# Patient Record
Sex: MALE | Race: WHITE | URBAN - METROPOLITAN AREA
[De-identification: names, ages, dates, MRNs, and addresses within clinical notes are randomized per-mention and may not be internally consistent; named-entity substitution may affect disease eponyms.]

---

## 2018-05-17 ENCOUNTER — EMERGENCY (EMERGENCY)
Facility: HOSPITAL | Age: 33
LOS: 0 days | Discharge: HOME | End: 2018-05-17
Admitting: PHYSICIAN ASSISTANT

## 2018-05-17 VITALS
DIASTOLIC BLOOD PRESSURE: 61 MMHG | TEMPERATURE: 101 F | OXYGEN SATURATION: 99 % | RESPIRATION RATE: 20 BRPM | HEART RATE: 97 BPM | SYSTOLIC BLOOD PRESSURE: 125 MMHG

## 2018-05-17 DIAGNOSIS — J18.9 PNEUMONIA, UNSPECIFIED ORGANISM: ICD-10-CM

## 2018-05-17 DIAGNOSIS — R05 COUGH: ICD-10-CM

## 2018-05-17 RX ORDER — AZITHROMYCIN 500 MG/1
1 TABLET, FILM COATED ORAL
Qty: 6 | Refills: 0 | OUTPATIENT
Start: 2018-05-17 | End: 2018-05-21

## 2018-05-17 RX ORDER — IBUPROFEN 200 MG
600 TABLET ORAL ONCE
Qty: 0 | Refills: 0 | Status: COMPLETED | OUTPATIENT
Start: 2018-05-17 | End: 2018-05-17

## 2018-05-17 RX ADMIN — Medication 600 MILLIGRAM(S): at 15:52

## 2018-05-17 NOTE — ED PROVIDER NOTE - NS ED ROS FT
Constitutional: no chills, no recent weight loss, change in appetite or malaise  Eyes: no redness/discharge/pain/vision changes  ENT: no rhinorrhea/ear pain/sore throat  Cardiac: No chest pain, SOB or edema.  Respiratory: No respiratory distress  GI: No nausea, vomiting, diarrhea or abdominal pain.  : No dysuria, frequency, urgency or hematuria  MS: no pain to back or extremities, no loss of ROM, no weakness  Neuro: No headache or weakness. No LOC.  Skin: No skin rash.  Endocrine: No history of thyroid disease or diabetes.  Except as documented in the HPI, all other systems are negative.

## 2018-05-17 NOTE — ED PROVIDER NOTE - PROGRESS NOTE DETAILS
Risk of worsening pneumonia d/w pt.   Pt looks well.  Abx choice considered given pt's history. Speaking in full sentences.  Vitals noted, sepsis not supported.  Does not currently appear to need admission. Abx risk dw pt. Pt aware needs f/u with PCP. Pt aware needs ER if worse.  pt has good aeration. full sentences. looks well.   limits of zpak dw pt who agrees return if worse.    pt agrees to ER if any worse.  options of abx, waiting, inhaler, steroids, OTC rx all considered and dw pt.  dw pt caution advised with medication's that make you drowsy

## 2018-05-17 NOTE — ED PROVIDER NOTE - OBJECTIVE STATEMENT
PRBCs infusing without difficulty. VSS. Will monitor closely. pt with cough and fever for 10 days  100.1 in er, no meds prior  producing dark yellow sputum  no sick contacts, no recent travel

## 2018-05-17 NOTE — ED ADULT NURSE NOTE - NS ED NURSE DC INFO COMPLEXITY
Moderate: Comprehensive teaching/Verbalized Understanding/Patient asked questions/Simple: Patient demonstrates quick and easy understanding

## 2019-01-15 ENCOUNTER — APPOINTMENT (OUTPATIENT)
Dept: HEMATOLOGY ONCOLOGY | Facility: CLINIC | Age: 34
End: 2019-01-15

## 2019-01-15 ENCOUNTER — LABORATORY RESULT (OUTPATIENT)
Age: 34
End: 2019-01-15

## 2019-01-15 VITALS
WEIGHT: 150.99 LBS | TEMPERATURE: 99 F | HEART RATE: 65 BPM | RESPIRATION RATE: 18 BRPM | DIASTOLIC BLOOD PRESSURE: 60 MMHG | SYSTOLIC BLOOD PRESSURE: 129 MMHG

## 2019-01-15 DIAGNOSIS — Z80.0 FAMILY HISTORY OF MALIGNANT NEOPLASM OF DIGESTIVE ORGANS: ICD-10-CM

## 2019-01-16 ENCOUNTER — OUTPATIENT (OUTPATIENT)
Dept: OUTPATIENT SERVICES | Facility: HOSPITAL | Age: 34
LOS: 1 days | Discharge: HOME | End: 2019-01-16

## 2019-01-16 ENCOUNTER — APPOINTMENT (OUTPATIENT)
Dept: HEMATOLOGY ONCOLOGY | Facility: CLINIC | Age: 34
End: 2019-01-16

## 2019-01-17 LAB
ALBUMIN MFR SERPL ELPH: 59.2 %
ALBUMIN SERPL-MCNC: 4.4 G/DL
ALBUMIN/GLOB SERPL: 1.5 RATIO
ALPHA1 GLOB MFR SERPL ELPH: 3.8 %
ALPHA1 GLOB SERPL ELPH-MCNC: 0.3 G/DL
ALPHA2 GLOB MFR SERPL ELPH: 9.2 %
ALPHA2 GLOB SERPL ELPH-MCNC: 0.7 G/DL
B-GLOBULIN MFR SERPL ELPH: 8.4 %
B-GLOBULIN SERPL ELPH-MCNC: 0.6 G/DL
DEPRECATED KAPPA LC FREE/LAMBDA SER: 1.62 RATIO
FERRITIN SERPL-MCNC: 87 NG/ML
FOLATE SERPL-MCNC: >20 NG/ML
GAMMA GLOB FLD ELPH-MCNC: 1.4 G/DL
GAMMA GLOB MFR SERPL ELPH: 19.4 %
HAPTOGLOB SERPL-MCNC: 114 MG/DL
HCT VFR BLD CALC: 39.4 %
HGB BLD-MCNC: 13 G/DL
INTERPRETATION SERPL IEP-IMP: NORMAL
IRON SATN MFR SERPL: 18 %
IRON SERPL-MCNC: 43 UG/DL
KAPPA LC CSF-MCNC: 2.08 MG/DL
KAPPA LC SERPL-MCNC: 3.38 MG/DL
LDH SERPL-CCNC: 189 U/L
M PROTEIN SPEC IFE-MCNC: NORMAL
MCHC RBC-ENTMCNC: 28.1 PG
MCHC RBC-ENTMCNC: 33 G/DL
MCV RBC AUTO: 85.3 FL
PLATELET # BLD AUTO: 221 K/UL
PMV BLD: 8.6 FL
PROT SERPL-MCNC: 7.4 G/DL
PROT SERPL-MCNC: 7.4 G/DL
RBC # BLD: 4.62 M/UL
RBC # FLD: 12.5 %
RETICS # AUTO: 0.8 %
RETICS AGGREG/RBC NFR: 35.1 K/UL
TIBC SERPL-MCNC: 245 UG/DL
UIBC SERPL-MCNC: 202 UG/DL
VIT B12 SERPL-MCNC: 915 PG/ML
WBC # FLD AUTO: 7.58 K/UL

## 2019-01-20 NOTE — HISTORY OF PRESENT ILLNESS
[de-identified] : CC I have anemia\par \par Here at the request of Dr. Saldana \par \par A 33 yom, no significant PMH, presents after being referred by PMD for weight loss  and anemia.  Patient states that approximately 2 years, he gradually began losing weight over several months, going from 72 kg to 65 kg without a change in his diet, although he did increase his working hours.  He is an  and is on his feet all day.  (Today, patient weighs 68.49 kg.)  No fevers, LAD, or anorexia.  His PMD performed labs and found him to have a hemoglobin of 12.3 with an MCV of 84.4.  WBC normal at 9.5, and platelets normal at 233.  Iron studies showed low iron at 11, low TIBC at 220, low transferrin saturation of 5, and normal ferritin of 171, suggestive of  anemia of chronic disease.\par \par He feels well otherwise. He did gained a few kg since he works less. Does not know if anyone has anemia in family. Denies any bleeding.

## 2019-01-20 NOTE — ASSESSMENT
[FreeTextEntry1] : 33 yom sent from PMD secondary to weight loss and unexplained normocytic anemia, with iron studies suggest  anemia of chronic disease but  based on history and exam there is no suggestion of a chronic disease \par \par 1. Weight loss-may be secondary to increased physical activity.  Patient has gained weight from 65 kg to 68 kg. \par 2. Normocytic anemia-iron studies consistent with anemia of chronic disease.  However, patient has no known chronic disease. Maybe this is his baseline?  Will check repeat CBC and iron studies, alpha globin mutation, hemoglobin electrophoresis, LDH, reticulocyte count, haptoglobin, folate, B12, SPEP, free light chains, immunofixation.  Will call patient with results and base plan for further follow up depending on test results.\par If studies are negative will see me in 3-6 months for CBC. A bone marrow at this point would be unlikely of any benefit but if anemia worsens will consider \par \par \par If unable to reach patient, he requests that we call his wife Cat at [phone].

## 2019-01-20 NOTE — REVIEW OF SYSTEMS
[Negative] : Allergic/Immunologic [FreeTextEntry5] : Complains that leg veins appear distended.  Wears compression stockings. [FreeTextEntry9] : c

## 2019-01-21 LAB
HGB A MFR BLD: 97.5 %
HGB A2 MFR BLD: 2.5 %
HGB FRACT BLD-IMP: NORMAL

## 2019-01-23 LAB — METHYLMALONATE SERPL-SCNC: 261 NMOL/L

## 2019-01-28 LAB
ALPHA - GLOBIN COMMON MUTATION RESULT: NORMAL
ALPHA - GLOBIN MUTATION VERBATIM: NORMAL

## 2019-02-01 DIAGNOSIS — D64.9 ANEMIA, UNSPECIFIED: ICD-10-CM

## 2019-06-25 ENCOUNTER — APPOINTMENT (OUTPATIENT)
Dept: HEMATOLOGY ONCOLOGY | Facility: CLINIC | Age: 34
End: 2019-06-25
Payer: COMMERCIAL

## 2019-06-25 ENCOUNTER — LABORATORY RESULT (OUTPATIENT)
Age: 34
End: 2019-06-25

## 2019-06-25 ENCOUNTER — OUTPATIENT (OUTPATIENT)
Dept: OUTPATIENT SERVICES | Facility: HOSPITAL | Age: 34
LOS: 1 days | Discharge: HOME | End: 2019-06-25

## 2019-06-25 VITALS
RESPIRATION RATE: 16 BRPM | DIASTOLIC BLOOD PRESSURE: 62 MMHG | HEIGHT: 71 IN | BODY MASS INDEX: 21 KG/M2 | TEMPERATURE: 96.7 F | SYSTOLIC BLOOD PRESSURE: 122 MMHG | HEART RATE: 66 BPM | WEIGHT: 150 LBS

## 2019-06-25 DIAGNOSIS — Z00.00 ENCOUNTER FOR GENERAL ADULT MEDICAL EXAMINATION W/OUT ABNORMAL FINDINGS: ICD-10-CM

## 2019-06-25 DIAGNOSIS — D64.9 ANEMIA, UNSPECIFIED: ICD-10-CM

## 2019-06-25 PROCEDURE — 99213 OFFICE O/P EST LOW 20 MIN: CPT

## 2019-06-26 LAB
HCT VFR BLD CALC: 38.9 %
HGB BLD-MCNC: 13.1 G/DL
MCHC RBC-ENTMCNC: 28.2 PG
MCHC RBC-ENTMCNC: 33.7 G/DL
MCV RBC AUTO: 83.7 FL
PLATELET # BLD AUTO: 241 K/UL
PMV BLD: 8.6 FL
RBC # BLD: 4.65 M/UL
RBC # FLD: 12.8 %
WBC # FLD AUTO: 5.6 K/UL

## 2019-06-27 DIAGNOSIS — D64.9 ANEMIA, UNSPECIFIED: ICD-10-CM

## 2019-06-27 NOTE — HISTORY OF PRESENT ILLNESS
[de-identified] : CC I have anemia\par \par Here at the request of Dr. Saldana \par \par A 33 yom, no significant PMH, presents after being referred by PMD for weight loss  and anemia.  Patient states that approximately 2 years, he gradually began losing weight over several months, going from 72 kg to 65 kg without a change in his diet, although he did increase his working hours.  He is an  and is on his feet all day.  (Today, patient weighs 68.49 kg.)  No fevers, LAD, or anorexia.  His PMD performed labs and found him to have a hemoglobin of 12.3 with an MCV of 84.4.  WBC normal at 9.5, and platelets normal at 233.  Iron studies showed low iron at 11, low TIBC at 220, low transferrin saturation of 5, and normal ferritin of 171, suggestive of  anemia of chronic disease.\par \par He feels well otherwise. He did gained a few kg since he works less. Does not know if anyone has anemia in family. Denies any bleeding.  [de-identified] : 6/25/2019: Patient presents for follow up.  He feels well overall and has no new complaints since last visit.

## 2019-06-27 NOTE — HISTORY OF PRESENT ILLNESS
[de-identified] : 6/25/2019: Patient presents for follow up.  He feels well overall and has no new complaints since last visit. [de-identified] : CC I have anemia\par \par Here at the request of Dr. Saldana \par \par A 33 yom, no significant PMH, presents after being referred by PMD for weight loss  and anemia.  Patient states that approximately 2 years, he gradually began losing weight over several months, going from 72 kg to 65 kg without a change in his diet, although he did increase his working hours.  He is an  and is on his feet all day.  (Today, patient weighs 68.49 kg.)  No fevers, LAD, or anorexia.  His PMD performed labs and found him to have a hemoglobin of 12.3 with an MCV of 84.4.  WBC normal at 9.5, and platelets normal at 233.  Iron studies showed low iron at 11, low TIBC at 220, low transferrin saturation of 5, and normal ferritin of 171, suggestive of  anemia of chronic disease.\par \par He feels well otherwise. He did gained a few kg since he works less. Does not know if anyone has anemia in family. Denies any bleeding.

## 2019-06-27 NOTE — ASSESSMENT
[FreeTextEntry1] : 34  yom sent from [facility] secondary to weight loss and unexplained normocytic anemia, with iron studies initially suggestive of chronic disease.  However, based on history and physical exam, there was no evidence of chronic disease.\par \par 1. Normocytic anemia of unknown etiology-Iron studies performed at [facility]'s office consistent with anemia of chronic disease.  However, patient had no known chronic disease. Repeat iron studies normal.  Alpha globin mutation, hemoglobin electrophoresis, LDH, reticulocyte count, haptoglobin, folate, B12, SPEP, free light chains, immunofixation unremarkable.  Hemoglobin values stable at 13 and 13.1 6 months apart.  Patient may have slightly low hemoglobin at baseline, this is probably normal for him.  Performing bone marrow biopsy would be very low yield and would only offer it if HgB decreases.\par -Hgb from today visit  was table would just monitor. \par \par Patient will follow up in one year.\par \par If unable to reach patient for any reason, he requests that we call his wife Cat at [phone].

## 2019-06-27 NOTE — ASSESSMENT
[FreeTextEntry1] : 34  yom sent from [facility] secondary to weight loss and unexplained normocytic anemia, with iron studies initially suggestive of chronic disease.  However, based on history and physical exam, there was no evidence of chronic disease.\par \par 1. Normocytic anemia of unknown etiology-Iron studies performed at [facility]'s office consistent with anemia of chronic disease.  However, patient had no known chronic disease. Repeat iron studies normal.  Alpha globin mutation, hemoglobin electrophoresis, LDH, reticulocyte count, haptoglobin, folate, B12, SPEP, free light chains, immunofixation unremarkable.  Hemoglobin values stable at 13 and 13.1 6 months apart.  Patient may have slightly low hemoglobin at baseline, this is probably normal for him.  Performing bone marrow biopsy would be very low yield and would only offer it if HgB decreases.\par -Hgb from today visit  was table would just monitor. \par \par Patient will follow up in one year.\par \par If unable to reach patient for any reason, he requests that we call his wife Cat at [phone].

## 2024-04-11 ENCOUNTER — EMERGENCY (EMERGENCY)
Facility: HOSPITAL | Age: 39
LOS: 0 days | Discharge: HOME | End: 2024-04-11
Attending: EMERGENCY MEDICINE
Payer: COMMERCIAL

## 2024-04-11 VITALS
HEART RATE: 79 BPM | SYSTOLIC BLOOD PRESSURE: 134 MMHG | OXYGEN SATURATION: 99 % | DIASTOLIC BLOOD PRESSURE: 72 MMHG | TEMPERATURE: 98 F | RESPIRATION RATE: 18 BRPM

## 2024-04-11 DIAGNOSIS — M54.2 CERVICALGIA: ICD-10-CM

## 2024-04-11 DIAGNOSIS — M25.511 PAIN IN RIGHT SHOULDER: ICD-10-CM

## 2024-04-11 PROCEDURE — 99284 EMERGENCY DEPT VISIT MOD MDM: CPT

## 2024-04-11 PROCEDURE — 99283 EMERGENCY DEPT VISIT LOW MDM: CPT

## 2024-04-11 RX ORDER — LIDOCAINE 4 G/100G
1 CREAM TOPICAL
Qty: 1 | Refills: 0
Start: 2024-04-11 | End: 2024-04-15

## 2024-04-11 RX ORDER — TIZANIDINE 4 MG/1
1 TABLET ORAL
Qty: 5 | Refills: 0
Start: 2024-04-11 | End: 2024-04-15

## 2024-04-11 RX ORDER — IBUPROFEN 200 MG
1 TABLET ORAL
Qty: 15 | Refills: 0
Start: 2024-04-11 | End: 2024-04-15

## 2024-04-11 RX ORDER — LIDOCAINE 4 G/100G
1 CREAM TOPICAL ONCE
Refills: 0 | Status: COMPLETED | OUTPATIENT
Start: 2024-04-11 | End: 2024-04-11

## 2024-04-11 RX ADMIN — LIDOCAINE 1 PATCH: 4 CREAM TOPICAL at 10:01

## 2024-04-11 NOTE — ED PROVIDER NOTE - PATIENT PORTAL LINK FT
You can access the FollowMyHealth Patient Portal offered by [facility] by registering at the following website: [email or web address]. By joining [facility]’s FollowMyHealth portal, you will also be able to view your health information using other applications (apps) compatible with our system.

## 2024-04-11 NOTE — ED PROVIDER NOTE - CLINICAL SUMMARY MEDICAL DECISION MAKING FREE TEXT BOX
38 male here for evaluation of right trapezius pain for the past 3 days.  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  He has no chest pain shortness of breath.  Agree with above exam  Impression  Patient with right trapezius pain plan for anti-inflammatories advised outpatient follow-up.

## 2024-04-11 NOTE — ED PROVIDER NOTE - PHYSICAL EXAMINATION
Vital Signs: I have reviewed the initial vital signs.  CONSTITUTIONAL: Pt in no acute distress sitting upright in exam chair.  SKIN: Skin exam is warm and dry, no acute rash.  HEAD: Normocephalic; atraumatic.  NECK: Supple; non tender. FROM.  MSK: Normal ROM with mild discomfort of right neck/ trapezius. No bony tenderness. Pt reports mild relief of symptoms with palpation of right trapezius. No edema. No midline spinal tenderness. No overlying skin changes.

## 2024-04-11 NOTE — ED PROVIDER NOTE - NSFOLLOWUPINSTRUCTIONS_ED_ALL_ED_FT
As discussed the emergency department take 600 mg ibuprofen every 8 hours as needed for pain.  Use lidocaine patch up to 12 hours/day, followed by complete removal for 12 hours as needed for pain.    Strain    A strain is a stretch or tear in one of the muscles in your body. This is caused by an injury to the area such as a twisting mechanism. Symptoms include pain, swelling, or bruising. Rest that area over the next several days and slowly resume activity when tolerated. Ice can help with swelling and pain.     SEEK IMMEDIATE MEDICAL CARE IF YOU HAVE ANY OF THE FOLLOWING SYMPTOMS: worsening pain, inability to move that body part, numbness or tingling.

## 2024-04-11 NOTE — ED PROVIDER NOTE - OBJECTIVE STATEMENT
38-year-old male no past medical history presents with right trapezius pain X 3 days.  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  Patient states he has similar symptoms few years ago which resolved with yoga, stretching and time.  Patient states he does have difficulty sleeping at night due to pain and stiffness to the area so came to the ED.  Patient states he has not taken any medications or seen any doctors for symptoms.

## 2025-07-03 ENCOUNTER — EMERGENCY (EMERGENCY)
Facility: HOSPITAL | Age: 40
LOS: 0 days | Discharge: HOME | End: 2025-07-04
Attending: EMERGENCY MEDICINE
Payer: COMMERCIAL

## 2025-07-03 VITALS
TEMPERATURE: 98 F | OXYGEN SATURATION: 99 % | HEIGHT: 66 IN | RESPIRATION RATE: 17 BRPM | HEART RATE: 116 BPM | SYSTOLIC BLOOD PRESSURE: 160 MMHG | WEIGHT: 158.73 LBS | DIASTOLIC BLOOD PRESSURE: 96 MMHG

## 2025-07-03 LAB
BASOPHILS # BLD AUTO: 0.04 K/UL — SIGNIFICANT CHANGE UP (ref 0–0.2)
BASOPHILS NFR BLD AUTO: 0.7 % — SIGNIFICANT CHANGE UP (ref 0–1)
EOSINOPHIL # BLD AUTO: 0.12 K/UL — SIGNIFICANT CHANGE UP (ref 0–0.7)
EOSINOPHIL NFR BLD AUTO: 2.2 % — SIGNIFICANT CHANGE UP (ref 0–8)
HCT VFR BLD CALC: 40.1 % — LOW (ref 42–52)
HGB BLD-MCNC: 13.4 G/DL — LOW (ref 14–18)
IMM GRANULOCYTES NFR BLD AUTO: 0 % — LOW (ref 0.1–0.3)
LYMPHOCYTES # BLD AUTO: 1.84 K/UL — SIGNIFICANT CHANGE UP (ref 1.2–3.4)
LYMPHOCYTES # BLD AUTO: 33.1 % — SIGNIFICANT CHANGE UP (ref 20.5–51.1)
MCHC RBC-ENTMCNC: 28.2 PG — SIGNIFICANT CHANGE UP (ref 27–31)
MCHC RBC-ENTMCNC: 33.4 G/DL — SIGNIFICANT CHANGE UP (ref 32–37)
MCV RBC AUTO: 84.4 FL — SIGNIFICANT CHANGE UP (ref 80–94)
MONOCYTES # BLD AUTO: 0.48 K/UL — SIGNIFICANT CHANGE UP (ref 0.1–0.6)
MONOCYTES NFR BLD AUTO: 8.6 % — SIGNIFICANT CHANGE UP (ref 1.7–9.3)
NEUTROPHILS # BLD AUTO: 3.08 K/UL — SIGNIFICANT CHANGE UP (ref 1.4–6.5)
NEUTROPHILS NFR BLD AUTO: 55.4 % — SIGNIFICANT CHANGE UP (ref 42.2–75.2)
NRBC BLD AUTO-RTO: 0 /100 WBCS — SIGNIFICANT CHANGE UP (ref 0–0)
PLATELET # BLD AUTO: 236 K/UL — SIGNIFICANT CHANGE UP (ref 130–400)
PMV BLD: 8.8 FL — SIGNIFICANT CHANGE UP (ref 7.4–10.4)
RBC # BLD: 4.75 M/UL — SIGNIFICANT CHANGE UP (ref 4.7–6.1)
RBC # FLD: 12.7 % — SIGNIFICANT CHANGE UP (ref 11.5–14.5)
WBC # BLD: 5.56 K/UL — SIGNIFICANT CHANGE UP (ref 4.8–10.8)
WBC # FLD AUTO: 5.56 K/UL — SIGNIFICANT CHANGE UP (ref 4.8–10.8)

## 2025-07-03 PROCEDURE — 85025 COMPLETE CBC W/AUTO DIFF WBC: CPT

## 2025-07-03 PROCEDURE — 99285 EMERGENCY DEPT VISIT HI MDM: CPT | Mod: 25

## 2025-07-03 PROCEDURE — 80053 COMPREHEN METABOLIC PANEL: CPT

## 2025-07-03 PROCEDURE — 36415 COLL VENOUS BLD VENIPUNCTURE: CPT

## 2025-07-03 PROCEDURE — 93010 ELECTROCARDIOGRAM REPORT: CPT

## 2025-07-03 PROCEDURE — 84484 ASSAY OF TROPONIN QUANT: CPT

## 2025-07-03 PROCEDURE — 99285 EMERGENCY DEPT VISIT HI MDM: CPT

## 2025-07-03 PROCEDURE — 71046 X-RAY EXAM CHEST 2 VIEWS: CPT

## 2025-07-03 PROCEDURE — 93005 ELECTROCARDIOGRAM TRACING: CPT

## 2025-07-03 PROCEDURE — 70450 CT HEAD/BRAIN W/O DYE: CPT

## 2025-07-03 RX ADMIN — Medication 1000 MILLILITER(S): at 23:36

## 2025-07-03 NOTE — ED PROVIDER NOTE - PHYSICAL EXAMINATION
VITAL SIGNS: I have reviewed nursing notes and confirm.  CONSTITUTIONAL: Well-developed; well-nourished; in no acute distress.  SKIN: Skin exam is warm and dry, no acute rash.  HEAD: Normocephalic; atraumatic.  EYES: PERRL, EOM intact; conjunctiva and sclera clear.  NECK: Supple; non tender.  CARD: S1, S2 normal; no murmurs, gallops, or rubs. Regular rate and rhythm.  RESP: Normal respiratory effort, no tachypnea or distress. Lungs CTAB, no wheezes, rales or rhonchi.  ABD: soft, NT/ND.  EXT: Normal ROM. No clubbing, cyanosis or edema.  NEURO: Alert, oriented. Grossly unremarkable. No focal deficits. CN2-12 grossly intact, 5/5 str/sensation b/l  PSYCH: Cooperative, appropriate.

## 2025-07-03 NOTE — ED PROVIDER NOTE - ATTENDING CONTRIBUTION TO CARE
40-year-old male with no significant past medical history presents for evaluation of multiple complaints.  Patient reports that several hours ago while sitting to eat dinner he developed left-sided chest pressure.  Also reports having some left-sided chest pressure radiating to his head.  Denies associated shortness of breath, diaphoresis, nausea, vomiting.  Denies extremity pain or swelling.  Denies radiation of the chest pain to the back of the arm.  Patient also reports coming to social service yesterday.  VSS, non toxic appearing, NAD, Head NCAT, MMM, neck supple, normal ROM, normal s1s2, lungs ctab, abd s/nt/nd, no guarding or rebound, extremities wnl, AAO x 3, GCS 15, CN II to XII within normal limits, normal motor, sensation, cerebellar and gait exams. No acute skin lesions. Plan is EKG, labs, chest x-ray, IVF, CT head and reassess.

## 2025-07-03 NOTE — ED PROVIDER NOTE - OBJECTIVE STATEMENT
Patient is a 40-year-old male with no significant past medical history presenting today with headache.  Patient states that for 1 hour prior to arrival to ED he has had a constellation of symptoms including his wife's voice "sounding different", and a warm and pulling sensation from his left posterior neck to the left occiput.  Patient denies any fever, LOC, vision change, nausea, vomiting, abdominal pain, chest pain, shortness of breath, palpitations, recent illness.  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

## 2025-07-03 NOTE — ED PROVIDER NOTE - PATIENT PORTAL LINK FT
You can access the FollowMyHealth Patient Portal offered by [facility] by registering at the following website: [email or web address]. By joining [employer]’s FollowMyHealth portal, you will also be able to view your health information using other applications (apps) compatible with our system.

## 2025-07-03 NOTE — ED ADULT TRIAGE NOTE - CHIEF COMPLAINT QUOTE
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 denies any n/v, changes in visions.

## 2025-07-03 NOTE — ED ADULT NURSE NOTE - NSFALLUNIVINTERV_ED_ALL_ED
Bed/Stretcher in lowest position, wheels locked, appropriate side rails in place/Call bell, personal items and telephone in reach/Instruct patient to call for assistance before getting out of bed/chair/stretcher/Non-slip footwear applied when patient is off stretcher/[place] to call system/Physically safe environment - no spills, clutter or unnecessary equipment/Purposeful proactive rounding/Room/bathroom lighting operational, light cord in reach

## 2025-07-04 LAB
ALBUMIN SERPL ELPH-MCNC: 4.6 G/DL — SIGNIFICANT CHANGE UP (ref 3.5–5.2)
ALP SERPL-CCNC: 68 U/L — SIGNIFICANT CHANGE UP (ref 30–115)
ALT FLD-CCNC: 17 U/L — SIGNIFICANT CHANGE UP (ref 0–41)
ANION GAP SERPL CALC-SCNC: 9 MMOL/L — SIGNIFICANT CHANGE UP (ref 7–14)
AST SERPL-CCNC: 19 U/L — SIGNIFICANT CHANGE UP (ref 0–41)
BILIRUB SERPL-MCNC: 0.2 MG/DL — SIGNIFICANT CHANGE UP (ref 0.2–1.2)
BUN SERPL-MCNC: 23 MG/DL — HIGH (ref 10–20)
CALCIUM SERPL-MCNC: 9.3 MG/DL — SIGNIFICANT CHANGE UP (ref 8.4–10.5)
CHLORIDE SERPL-SCNC: 104 MMOL/L — SIGNIFICANT CHANGE UP (ref 98–110)
CO2 SERPL-SCNC: 25 MMOL/L — SIGNIFICANT CHANGE UP (ref 17–32)
CREAT SERPL-MCNC: 1.1 MG/DL — SIGNIFICANT CHANGE UP (ref 0.7–1.5)
EGFR: 88 ML/MIN/1.73M2 — SIGNIFICANT CHANGE UP
EGFR: 88 ML/MIN/1.73M2 — SIGNIFICANT CHANGE UP
GLUCOSE SERPL-MCNC: 121 MG/DL — HIGH (ref 70–99)
POTASSIUM SERPL-MCNC: 3.7 MMOL/L — SIGNIFICANT CHANGE UP (ref 3.5–5)
POTASSIUM SERPL-SCNC: 3.7 MMOL/L — SIGNIFICANT CHANGE UP (ref 3.5–5)
PROT SERPL-MCNC: 7.6 G/DL — SIGNIFICANT CHANGE UP (ref 6–8)
SODIUM SERPL-SCNC: 138 MMOL/L — SIGNIFICANT CHANGE UP (ref 135–146)
TROPONIN T, HIGH SENSITIVITY RESULT: <6 NG/L — SIGNIFICANT CHANGE UP (ref 6–21)

## 2025-07-04 PROCEDURE — 71046 X-RAY EXAM CHEST 2 VIEWS: CPT | Mod: 26

## 2025-07-04 PROCEDURE — 70450 CT HEAD/BRAIN W/O DYE: CPT | Mod: 26
